# Patient Record
(demographics unavailable — no encounter records)

---

## 2025-03-17 NOTE — REASON FOR VISIT
[Initial] : an initial consultation for [Abnormal Uterine Bleeding] : abnormal uterine bleeding [Infertility] : infertility

## 2025-03-20 NOTE — PHYSICAL EXAM
[Chaperone Present] : A chaperone was present in the examining room during all aspects of the physical examination [Appropriately responsive] : appropriately responsive [Alert] : alert [No Acute Distress] : no acute distress [No Lymphadenopathy] : no lymphadenopathy [Soft] : soft [Non-tender] : non-tender [Non-distended] : non-distended [Oriented x3] : oriented x3 [Examination Of The Breasts] : a normal appearance [No Discharge] : no discharge [No Masses] : no breast masses were palpable [Labia Majora] : normal [Labia Minora] : normal [Normal] : normal [Uterine Adnexae] : non-palpable [FreeTextEntry2] : Michael [FreeTextEntry6] : No tenderness, No nipple discharge and no adenopathy. Last month breast reduction surgery.

## 2025-03-20 NOTE — HISTORY OF PRESENT ILLNESS
[Patient reported PAP Smear was normal] : Patient reported PAP Smear was normal [N] : Patient does not use contraception [Y] : Positive pregnancy history [No] : Patient does not have concerns regarding sex [Currently Active] : currently active [Men] : men [TextBox_4] : 37YO patient presents for initial GYN visit. Diagnosed with Lupus, last Oct 2024. Hx of breast cancer, paternal gma dx at 60-70's. Patient states that her previous GYN said she might have endometriosis. Patient had a breast reduction last month, has never gotten a baseline mammogram/ breast ultrasound. Patient would like to discuss her options of possibly trying to conceive again. Last delivery was a midline vertical  section.  Patient complained of passing a huge clot in January with menses.  [PapSmeardate] : [id number] [HPVDate] : [id number] [LMPDate] : 2/26/25 [PGxTotal] : 1 [[facility] [FreeTextEntry1] : 2/26/25

## 2025-03-20 NOTE — DISCUSSION/SUMMARY
[FreeTextEntry1] :  GYN Annual evaluation today -pap smear and vaginal cultures sent TVS done today - one small fibroid, measuring 2.1 cm. a simple cyst almost 3 cm in size. We discussed -- Vaginitis, UTI, AUB, Fibroid uterus, Ovarian cyst and Pre-conception. Patient verbalized understanding of all explanations, and her questions were answered, and all concerns were addressed. Patient was screened for depression - no signs of clinical depression. PHQ-2 on file Rx given for mammogram and B sonogram RTO in 1 year for annual   Minnie Mcdonald MD, FACOG

## 2025-03-20 NOTE — HISTORY OF PRESENT ILLNESS
[Patient reported PAP Smear was normal] : Patient reported PAP Smear was normal [N] : Patient does not use contraception [Y] : Positive pregnancy history [No] : Patient does not have concerns regarding sex [Currently Active] : currently active [Men] : men [TextBox_4] : 37YO patient presents for initial GYN visit. Diagnosed with Lupus, last Oct 2024. Hx of breast cancer, paternal gma dx at 60-70's. Patient states that her previous GYN said she might have endometriosis. Patient had a breast reduction last month, has never gotten a baseline mammogram/ breast ultrasound. Patient would like to discuss her options of possibly trying to conceive again. Last delivery was a midline vertical  section.  Patient complained of passing a huge clot in January with menses.  [PapSmeardate] : [id number] [HPVDate] : [id number] [LMPDate] : 2/26/25 [PGxTotal] : 1 [[facility] [FreeTextEntry1] : 2/26/25

## 2025-03-31 NOTE — HISTORY OF PRESENT ILLNESS
[FreeTextEntry1] : 37 yo female here as a new pt and for annual PE. Pt was dx/d with lupus last year. Seeing rheumatologist and is taking Benlysta. Taking Zepbound off and on for one year. Has lost almost 50 lbs.

## 2025-03-31 NOTE — PLAN
[FreeTextEntry1] : check annual routine bloodwork medication reconciliation performed advised healthy diet/exercise discussed vaccines- did not get flu or covid vaccines in the fall.  Depression screen done & reviewed 5 minutes  SLE- cont Benlysta. cont to f/u with Rheum Anemia- check CBC Asthma- cont inhalers Overweight- has been seeing Weight loss doctor and is taking Zepbound

## 2025-03-31 NOTE — HEALTH RISK ASSESSMENT
[Good] : ~his/her~  mood as  good [2 - 4 times a month (2 pts)] : 2-4 times a month (2 points) [PHQ-2 Negative - No further assessment needed] : PHQ-2 Negative - No further assessment needed [Time Spent: ___ Minutes] : I spent [unfilled] minutes performing a depression screening for this patient. [Never] : Never [MammogramComments] : breast reduction surgery 2/25 [PapSmearDate] : 3/25

## 2025-05-21 NOTE — HISTORY OF PRESENT ILLNESS
[Patient reported PAP Smear was abnormal] : Patient reported PAP Smear was abnormal [N] : Patient does not use contraception [No] : Patient does not have concerns regarding sex [FreeTextEntry1] : 39 YO F patient presents for GYN f/u visit to discuss about recent MRI results. [PapSmeardate] : 3/17/25 [TextBox_31] : hpv +  [HPVDate] : 3/17/25 [TextBox_78] : + [LMPDate] : 5/1/25

## 2025-05-21 NOTE — PHYSICAL EXAM
[MA] : MA [Appropriately responsive] : appropriately responsive [Alert] : alert [No Acute Distress] : no acute distress [Oriented x3] : oriented x3 [FreeTextEntry2] : Michael

## 2025-05-21 NOTE — DISCUSSION/SUMMARY
[FreeTextEntry1] : GYN evaluation today. We discussed -- Dyspareunia and Primary female infertility causes and treatment options. We discussed that patient has been experiencing painful intercourse and periods. Also, patient has been trying to conceive for about a year unsuccessful. MRI was negative for endometriosis, but this does not definitively rule out the condition. No signs of adenomyosis or endometriomas were observed on imaging.  PLAN: Consider laparoscopy for definitive diagnosis if symptoms persist. Consider hysterosalpingogram to assess uterine cavity and fallopian tube patency. Patient has a history of HPV positivity and abnormal Pap smear. Previous colposcopy was performed by another provider elsewhere, and the issue cleared as per patient.  Refer to fertility specialist for further evaluation and treatment planning.  Patient verbalized understanding of all explanations, and her questions were answered, and all concerns were addressed. Patient was screened for depression - no signs of clinical depression, PHQ-2 on file. RTO in August for Colposcopy   Michael CARDONA acting as scribe for Dr. Mcdonald. 05/20/2025   The documentation recorded by the scribe, in my presence, accurately reflects the service I personally performed, and the decisions made by me with my edits as appropriate on 05/21/2025  Minnie Mcdonald MD, FACOG

## 2025-05-21 NOTE — HISTORY OF PRESENT ILLNESS
[Patient reported PAP Smear was abnormal] : Patient reported PAP Smear was abnormal [N] : Patient does not use contraception [No] : Patient does not have concerns regarding sex [FreeTextEntry1] : 37 YO F patient presents for GYN f/u visit to discuss about recent MRI results. [PapSmeardate] : 3/17/25 [TextBox_31] : hpv +  [HPVDate] : 3/17/25 [TextBox_78] : + [LMPDate] : 5/1/25